# Patient Record
Sex: FEMALE | Race: OTHER | URBAN - METROPOLITAN AREA
[De-identification: names, ages, dates, MRNs, and addresses within clinical notes are randomized per-mention and may not be internally consistent; named-entity substitution may affect disease eponyms.]

---

## 2019-04-29 PROBLEM — E55.9 VITAMIN D DEFICIENCY DISEASE: Status: ACTIVE | Noted: 2019-04-29

## 2019-04-29 PROBLEM — D50.9 IRON DEFICIENCY ANEMIA: Status: ACTIVE | Noted: 2019-04-29

## 2019-06-20 PROBLEM — E83.42 HYPOMAGNESEMIA: Status: ACTIVE | Noted: 2019-06-20

## 2024-06-18 ENCOUNTER — ORDER TRANSCRIPTION (OUTPATIENT)
Dept: ADMINISTRATIVE | Facility: HOSPITAL | Age: 59
End: 2024-06-18

## 2024-06-18 DIAGNOSIS — R27.8 SENSORY ATAXIA: ICD-10-CM

## 2024-06-18 DIAGNOSIS — R53.1 WEAKNESS: ICD-10-CM

## 2024-06-18 DIAGNOSIS — E11.42 DIABETIC POLYNEUROPATHY ASSOCIATED WITH TYPE 2 DIABETES MELLITUS (HCC): Primary | ICD-10-CM

## 2024-06-18 DIAGNOSIS — G61.0 ACUTE INFLAMMATORY DEMYELINATING POLYNEUROPATHY (HCC): ICD-10-CM

## 2024-08-05 ENCOUNTER — PROCEDURE VISIT (OUTPATIENT)
Dept: PHYSICAL MEDICINE AND REHAB | Facility: CLINIC | Age: 59
End: 2024-08-05
Payer: MEDICAID

## 2024-08-05 DIAGNOSIS — E11.42 DIABETIC POLYNEUROPATHY ASSOCIATED WITH TYPE 2 DIABETES MELLITUS (HCC): Primary | ICD-10-CM

## 2024-08-05 PROCEDURE — 95886 MUSC TEST DONE W/N TEST COMP: CPT | Performed by: PHYSICAL MEDICINE & REHABILITATION

## 2024-08-05 PROCEDURE — 95909 NRV CNDJ TST 5-6 STUDIES: CPT | Performed by: PHYSICAL MEDICINE & REHABILITATION

## 2024-08-05 NOTE — PROGRESS NOTES
[facility]  Electromyography Consultation      History of Present Illness:    Dear Dr. Camilo,  Thank you for the opportunity to see Yamel Gong for electrodiagnostic consultation today. As you know the patient is a 59 year old female with a chief complaint of symmetric bilateral upper extremity numbness and tingling.  She has a complex history of long-term diabetes with polyneuropathy.  Also she has a history of AIDP.  Symptoms are also associated with leg swelling.  She recently presented to the emergency department thinking she might have a reexacerbation of AIDP but was discharged.  Symptoms are symmetric, perhaps slightly worse on the left.    PAST MEDICAL HISTORY:  Past Medical History:    Anemia    Chronic kidney disease, stage 3 ([facility])    CKD (chronic kidney disease) stage 1, GFR 90 ml/min or greater    Diabetes ([facility])    DM (diabetes mellitus) ([facility])    Edema    Essential hypertension    Hyperlipidemia    Hyperparathyroidism, secondary renal ([facility])    Iron deficiency anemia    Osteoarthritis    Type 2 diabetes mellitus with diabetic nephropathy ([facility])    Vitamin D deficiency disease       SURGICAL HISTORY:  Past Surgical History:   Procedure Laterality Date    Other surgical history  2014    Toe Surgery       SOCIAL HISTORY:   Social History     Occupational History    Not on file   Tobacco Use    Smoking status: Never    Smokeless tobacco: Never   Vaping Use    Vaping status: Never Used   Substance and Sexual Activity    Alcohol use: Not Currently    Drug use: Never    Sexual activity: Not on file       FAMILY HISTORY:   Family History   Problem Relation Age of Onset    Diabetes Father        CURRENT MEDICATIONS:   Current Outpatient Medications   Medication Sig Dispense Refill    enalapril 10 MG Oral Tab Take 10 mg by mouth daily.      aspirin 81 MG Oral Tab EC Take 81 mg by mouth daily.      Coenzyme Q10 (COQ-10) 100 MG Oral Cap Take 1 capsule by mouth  daily.      gabapentin 300 MG Oral Cap Take 300 mg by mouth. 3 capsules in the morning, 2 capsules at night.      simvastatin 20 MG Oral Tab Take 20 mg by mouth nightly.      VITAMIN D-VITAMIN K OR Take by mouth.      Omega-3 Fatty Acids (OMEGA-3 OR) Take by mouth.      Magnesium 100 MG Oral Tab Take 1 tablet by mouth daily.      furosemide 20 MG Oral Tab Take 1 tablet (20 mg total) by mouth daily as needed (leg swelling). 30 tablet 5    Potassium Chloride ER 20 MEQ Oral Tab CR Take 20 mEq by mouth daily. 30 tablet 5    metFORMIN HCl 1000 MG Oral Tab Take 1,000 mg by mouth 2 (two) times daily with meals.      insulin aspart 100 UNIT/ML Subcutaneous Solution Inject into the skin. Follow The Chart Directions Given By [facility]      insulin detemir 100 UNIT/ML Subcutaneous Solution Pen-injector Inject into the skin nightly.      insulin detemir 100 UNIT/ML Subcutaneous Solution Inject into the skin nightly. Levemir - 20 Units Subcutaneous         PHYSICAL EXAM:   There were no vitals taken for this visit.    There is no height or weight on file to calculate BMI.      General: No immediate distress   Extremities: peripheral pulses intact, no lower extremity edema bilaterally   Skin: No lesions noted.   Neuro:   Strength: Upper and lower extremities with mild diffuse weakness  Muscle bulk: No atrophy  Sensation: Decreased to light touch distally  Reflexes: Areflexic  SLR: Negative bilaterally      EMG/NCV  Sensory Nerve Conduction Study       Nerve / Sites Peak Lat Amp Segments Distance    ms µV  cm   L Median - Dig III (Antidromic)      Wrist NR NR Wrist - Dig III 14   L Ulnar - Dig V (Antidromic)      Wrist NR NR Wrist - Dig V 14   L Sural - (Antidromic)      Calf NR NR Calf - Ankle 14       Motor Nerve Conduction Study       Nerve / Sites Latency Amplitude Rel Amp Dur. Dur. Segments Distance Velocity Area Area    ms mV % ms %  cm m/s mVms %   L Median - APB      Wrist 6.3 4.3  6.4 100 Wrist - APB 8  14.3  100      Elbow 13.1 3.6 82.2 7.1 112 Elbow - Wrist 22.5 33 13.5 94.4   L Ulnar - ADM      Wrist 4.0 6.6 100 6.6 100 Wrist - ADM 8  22.6 100      B.Elbow 9.6 4.6 69.7 8.4 127 B.Elbow - Wrist 22.5 40 18.9 83.9   L Peroneal - EDB      Ankle 5.4 1.6 100 6.8 100 Fib Head - Tib Ant 8  4.7 100      Fib head 15.2 1.2 74.8 6.9 102 Pop fossa - Fib Head 29.5 30 4.2 89.3       EMG Summary Table     Spontaneous MUAP Recruitment   Muscle IA Fib PSW Fasc Comments Amp Dur. PPP Pattern   L. Deltoid N None None None None N N N Reduced   L. Triceps brachii N None None None None N N N Reduced   L. Biceps brachii N None None None None N N N N   L. Flexor carpi radialis N None None None None N N N N   L. First dorsal interosseous N None None None None N N N N   L. Abductor pollicis brevis N None None None None N N N N   L. Vastus lateralis N None None None None N N N N   L. Tibialis anterior N None None None None N N N N   L. Peroneus longus N None None None None N N N N   L. Gastrocnemius (Medial head) N None None None None N N N N   L. Dorsal interossei (pedis) N 1+ None None None N N N N                   Findings: Extremities were warmed with hot packs for 15 minutes prior to testing.  Sensory nerve conduction studies revealed absent left median, ulnar and sural sensory responses.  Motor nerve conduction studies revealed a left median response with an increased latency, small amplitudes and slow conduction velocity.  The left ulnar response also had increased latency, small amplitudes and slow conduction velocity.  The left common peroneal motor response had decreased amplitudes and slow conduction velocity.  Needle EMG revealed motor unit abnormalities in the deltoid and triceps on the left, 1+ fibrillations in the dorsal interosseous of the foot.  Otherwise all other muscles tested were normal.  Impression:  1.  Abnormal study.  2.  Electrodiagnostic evidence is consistent with peripheral polyneuropathy.  This is characterized by  significant sensory axonal loss, motor axon loss and demyelination.  No acute denervation.  No temporal dispersion.  3.  No electrodiagnostic evidence of cervical or lumbar radiculopathy on the left      ASSESSMENT AND PLAN:  1. Diabetic polyneuropathy associated with type 2 diabetes mellitus (HCC)  Given both concomitant axon loss and demyelination, diabetic neuropathy is favored to be the most likely etiology.  Velocities are slower than expected for pure diabetic neuropathy, so a component of CIDP may be superimposed however this is less likely as there is no evidence of temporal dispersion.        Thank you for the opportunity to participate in the care of this patient.  Sincerely,    Evans Das M.D.  Diplomate [employer] and [employer]